# Patient Record
Sex: FEMALE | Race: WHITE | NOT HISPANIC OR LATINO
[De-identification: names, ages, dates, MRNs, and addresses within clinical notes are randomized per-mention and may not be internally consistent; named-entity substitution may affect disease eponyms.]

---

## 2017-07-15 ENCOUNTER — HEALTH MAINTENANCE LETTER (OUTPATIENT)
Age: 52
End: 2017-07-15

## 2022-07-08 ENCOUNTER — OFFICE VISIT (OUTPATIENT)
Dept: URGENT CARE | Facility: URGENT CARE | Age: 57
End: 2022-07-08
Payer: COMMERCIAL

## 2022-07-08 VITALS
OXYGEN SATURATION: 94 % | DIASTOLIC BLOOD PRESSURE: 77 MMHG | SYSTOLIC BLOOD PRESSURE: 117 MMHG | HEART RATE: 72 BPM | WEIGHT: 191 LBS

## 2022-07-08 DIAGNOSIS — R46.89 BEHAVIORAL CHANGE: Primary | ICD-10-CM

## 2022-07-08 DIAGNOSIS — R30.0 DYSURIA: ICD-10-CM

## 2022-07-08 LAB
ALBUMIN UR-MCNC: NEGATIVE MG/DL
APPEARANCE UR: ABNORMAL
BACTERIA #/AREA URNS HPF: ABNORMAL /HPF
BILIRUB UR QL STRIP: NEGATIVE
COLOR UR AUTO: YELLOW
GLUCOSE UR STRIP-MCNC: 250 MG/DL
HGB UR QL STRIP: NEGATIVE
KETONES UR STRIP-MCNC: NEGATIVE MG/DL
LEUKOCYTE ESTERASE UR QL STRIP: ABNORMAL
NITRATE UR QL: NEGATIVE
PH UR STRIP: 6 [PH] (ref 5–7)
RBC #/AREA URNS AUTO: ABNORMAL /HPF
SP GR UR STRIP: 1.02 (ref 1–1.03)
SQUAMOUS #/AREA URNS AUTO: ABNORMAL /LPF
UROBILINOGEN UR STRIP-ACNC: 0.2 E.U./DL
WBC #/AREA URNS AUTO: ABNORMAL /HPF

## 2022-07-08 PROCEDURE — 87086 URINE CULTURE/COLONY COUNT: CPT | Performed by: PHYSICIAN ASSISTANT

## 2022-07-08 PROCEDURE — 81001 URINALYSIS AUTO W/SCOPE: CPT | Performed by: PHYSICIAN ASSISTANT

## 2022-07-08 PROCEDURE — 99204 OFFICE O/P NEW MOD 45 MIN: CPT | Performed by: PHYSICIAN ASSISTANT

## 2022-07-08 RX ORDER — CEFDINIR 300 MG/1
300 CAPSULE ORAL 2 TIMES DAILY
Qty: 14 CAPSULE | Refills: 0 | Status: SHIPPED | OUTPATIENT
Start: 2022-07-08 | End: 2022-07-15

## 2022-07-08 ASSESSMENT — PAIN SCALES - GENERAL: PAINLEVEL: NO PAIN (0)

## 2022-07-09 NOTE — PROGRESS NOTES
Assessment & Plan        1. Behavioral change      2. Dysuria    - UA macro with reflex to Microscopic and Culture - Clinc Collect  - Urine Microscopic Exam  - Urine Culture      - cefdinir (OMNICEF) 300 MG capsule; Take 1 capsule (300 mg) by mouth 2 times daily for 7 days  Dispense: 14 capsule; Refill: 0    UA positive. Culture pending.                 Y[facility] Cordova [facility]    Fredy Aguilar is a 57 year old female who presents to clinic today for the following health issues:  Chief Complaint   Patient presents with     Dysuria     HPI    Here for anger outbursts over the past 6 days. Concern for UTI infection. This is more than usual for her according to her daughter. No fever. Slight dysuria. Some incontinence normally and no increased frequency. No odor.           Review of Systems        Objective    /77   Pulse 72   Wt 86.6 kg (191 lb)   SpO2 94%   Breastfeeding No   Physical Exam  Abdominal:      General: Abdomen is flat. Bowel sounds are normal.      Palpations: Abdomen is soft.      Tenderness: There is no abdominal tenderness. There is no right CVA tenderness or left CVA tenderness.

## 2022-07-11 LAB — BACTERIA UR CULT: NORMAL

## 2022-07-12 ENCOUNTER — TELEPHONE (OUTPATIENT)
Dept: URGENT CARE | Facility: URGENT CARE | Age: 57
End: 2022-07-12

## 2022-07-12 NOTE — TELEPHONE ENCOUNTER
Pt's daughter, Mary diaz. No consent to communicate is on file. Pt is at her day program currently, so daughter is not with pt at this time. States she has court documents that state that she is pt's guardian, but does not normally go to this clinic so there is no copy in her [place] record. She is wondering UC results and if pt should continue taking antibiotic. Routing to [facility] UC to follow up with pt and daughter.    Monica Peterson RN  [facility]

## 2022-07-12 NOTE — TELEPHONE ENCOUNTER
Notify pt to take antibiotic as was prescribed.  Need documents on file to speak to someone other than pt.  Send letter addressed to pt if needed.

## 2024-08-13 ENCOUNTER — NURSE TRIAGE (OUTPATIENT)
Dept: NURSING | Facility: CLINIC | Age: 59
End: 2024-08-13
Payer: COMMERCIAL

## 2024-08-14 NOTE — TELEPHONE ENCOUNTER
"Triage Call:     Pt calling to report several symptoms this evening. She was originally calling regarding getting a colonoscopy scheduled sooner than October.     Pt has Upper middle abdomen pain rated 7-8/10 that is constant. She states that she has been having \"issues with her colon\" for about a week now. She is passing a lot of gas and having several small stools today. She states her abdomen is also a little more swollen than usual.     Pt states that she also has COPD, but her breathing is worse than her baseline today. She states she has been wheezing more and is more short of breath even at rest than her usual. Pt states she also has some chest pian. No discoloration of her lips or face. No audible wheezing now.     Disposition: ED. Pt states that she is going to call 911 instead of look for a ride to the ED.     Reason for Disposition   [1] MILD-MODERATE abdomen pain AND [2] constant AND [3] present > 2 hours   [1] MODERATE difficulty breathing (e.g., speaks in phrases, SOB even at rest, pulse 100-120) AND [2] NEW-onset or WORSE than normal    Additional Information   Negative: Sounds like a life-threatening emergency to the triager   Negative: [1] MODERATE-SEVERE SWELLING of abdomen (e.g., looks very distended or swollen) AND [2] NEW-onset or much worse AND [3] vomiting   Negative: Blood in bowel movements  (Exception: Blood on surface of BM with constipation.)   Negative: Black or tarry bowel movements  (Exception: Chronic-unchanged black-grey BMs AND is taking iron pills or Pepto-Bismol.)   Negative: [1] MILD SWELLING of abdomen (e.g., looks distended or swollen) AND [2] new-onset or getting worse AND [3] fever   Negative: SEVERE difficulty breathing (e.g., struggling for each breath, speaks in single words)   Negative: Choking on something   Negative: Difficult to awaken or acting confused (e.g., disoriented, slurred speech)   Negative: Passed out (i.e., lost consciousness, collapsed and was not " responding)   Negative: Bluish (or gray) lips or face now   Negative: Wheezing started suddenly after medicine, an allergic food or bee sting   Negative: [1] Breathing stopped AND [2] hasn't returned   Negative: Stridor (harsh sound while breathing in)   Negative: Slow, shallow and weak breathing   Negative: Sounds like a life-threatening emergency to the triager    Protocols used: Abdomen Bloating and Swelling-A-AH, Breathing Difficulty-A-AH  Ayala Mann RN   Triage Nurse Advisor on 8/13/2024 at 7:27 PM